# Patient Record
Sex: MALE | Race: WHITE
[De-identification: names, ages, dates, MRNs, and addresses within clinical notes are randomized per-mention and may not be internally consistent; named-entity substitution may affect disease eponyms.]

---

## 2018-01-19 ENCOUNTER — HOSPITAL ENCOUNTER (EMERGENCY)
Dept: HOSPITAL 75 - ER | Age: 28
Discharge: HOME | End: 2018-01-19
Payer: COMMERCIAL

## 2018-01-19 VITALS — SYSTOLIC BLOOD PRESSURE: 129 MMHG | DIASTOLIC BLOOD PRESSURE: 80 MMHG

## 2018-01-19 VITALS — HEIGHT: 69 IN | WEIGHT: 145 LBS | BODY MASS INDEX: 21.48 KG/M2

## 2018-01-19 DIAGNOSIS — J02.0: Primary | ICD-10-CM

## 2018-01-19 DIAGNOSIS — F17.210: ICD-10-CM

## 2018-01-19 LAB
ALBUMIN SERPL-MCNC: 4.2 GM/DL (ref 3.2–4.5)
ALP SERPL-CCNC: 73 U/L (ref 40–136)
ALT SERPL-CCNC: 35 U/L (ref 0–55)
BASOPHILS # BLD AUTO: 0 10^3/UL (ref 0–0.1)
BASOPHILS NFR BLD AUTO: 0 % (ref 0–10)
BASOPHILS NFR BLD MANUAL: 0 %
BILIRUB SERPL-MCNC: 1.3 MG/DL (ref 0.1–1)
BUN/CREAT SERPL: 10
CALCIUM SERPL-MCNC: 9.4 MG/DL (ref 8.5–10.1)
CHLORIDE SERPL-SCNC: 104 MMOL/L (ref 98–107)
CO2 SERPL-SCNC: 21 MMOL/L (ref 21–32)
CREAT SERPL-MCNC: 0.98 MG/DL (ref 0.6–1.3)
EOSINOPHIL # BLD AUTO: 0.1 10^3/UL (ref 0–0.3)
EOSINOPHIL NFR BLD AUTO: 0 % (ref 0–10)
EOSINOPHIL NFR BLD MANUAL: 0 %
ERYTHROCYTE [DISTWIDTH] IN BLOOD BY AUTOMATED COUNT: 12.2 % (ref 10–14.5)
GFR SERPLBLD BASED ON 1.73 SQ M-ARVRAT: > 60 ML/MIN
GLUCOSE SERPL-MCNC: 145 MG/DL (ref 70–105)
HCT VFR BLD CALC: 42 % (ref 40–54)
HGB BLD-MCNC: 15 G/DL (ref 13.3–17.7)
LYMPHOCYTES # BLD AUTO: 1.8 X 10^3 (ref 1–4)
LYMPHOCYTES NFR BLD AUTO: 7 % (ref 12–44)
MANUAL DIFFERENTIAL PERFORMED BLD QL: YES
MCH RBC QN AUTO: 30 PG (ref 25–34)
MCHC RBC AUTO-ENTMCNC: 36 G/DL (ref 32–36)
MCV RBC AUTO: 83 FL (ref 80–99)
MONOCYTES # BLD AUTO: 1.8 X 10^3 (ref 0–1)
MONOCYTES NFR BLD AUTO: 8 % (ref 0–12)
MONOCYTES NFR BLD: 4 %
NEUTROPHILS # BLD AUTO: 20.3 X 10^3 (ref 1.8–7.8)
NEUTROPHILS NFR BLD AUTO: 85 % (ref 42–75)
NEUTS BAND NFR BLD MANUAL: 82 %
NEUTS BAND NFR BLD: 8 %
PLATELET # BLD: 208 10^3/UL (ref 130–400)
PMV BLD AUTO: 10.4 FL (ref 7.4–10.4)
POTASSIUM SERPL-SCNC: 3.7 MMOL/L (ref 3.6–5)
PROT SERPL-MCNC: 7.7 GM/DL (ref 6.4–8.2)
RBC # BLD AUTO: 5.06 10^6/UL (ref 4.35–5.85)
RBC MORPH BLD: NORMAL
SODIUM SERPL-SCNC: 138 MMOL/L (ref 135–145)
VARIANT LYMPHS NFR BLD MANUAL: 6 %
WBC # BLD AUTO: 24 10^3/UL (ref 4.3–11)

## 2018-01-19 PROCEDURE — 85027 COMPLETE CBC AUTOMATED: CPT

## 2018-01-19 PROCEDURE — 96361 HYDRATE IV INFUSION ADD-ON: CPT

## 2018-01-19 PROCEDURE — 96374 THER/PROPH/DIAG INJ IV PUSH: CPT

## 2018-01-19 PROCEDURE — 96372 THER/PROPH/DIAG INJ SC/IM: CPT

## 2018-01-19 PROCEDURE — 80053 COMPREHEN METABOLIC PANEL: CPT

## 2018-01-19 PROCEDURE — 85007 BL SMEAR W/DIFF WBC COUNT: CPT

## 2018-01-19 PROCEDURE — 36415 COLL VENOUS BLD VENIPUNCTURE: CPT

## 2018-01-19 PROCEDURE — 87430 STREP A AG IA: CPT

## 2018-01-19 PROCEDURE — 96375 TX/PRO/DX INJ NEW DRUG ADDON: CPT

## 2018-01-19 PROCEDURE — 87804 INFLUENZA ASSAY W/OPTIC: CPT

## 2018-01-19 NOTE — ED COUGH/URI
General


Chief Complaint:  Cough/Cold/Flu Symptoms


Stated Complaint:  CHILLS/VOMITING/HEADACHE/SORE THROAT


Nursing Triage Note:  


PT TO ED 2 W/ S.O. FOR C/O CONGESTION, SORE THROAT, COUGH, CHILLS ET BODY ACHES 


X3 DAYS.  STATES HE'S TAKEN NYQUIL FOR SYMPTOMS BUT DENIES IMPROVEMENT


Source:  patient, family


Exam Limitations:  no limitations





History of Present Illness


Date Seen by Provider:  Jan 19, 2018


Time Seen by Provider:  20:12


Initial Comments


Here with report of fevers, chills, body aches, cough and occasional vomiting.  

States that he's coughing and gagging and vomiting.  States he doesn't believe 

that this is the flu because he has never felt like this when he is had the flu 

before and then goes on to describe all of the flu symptoms.  Denies dysuria.  

He has taken some NyQuil but otherwise not taken anything for the pain or other 

symptoms.


Timing/Duration:  getting worse, other


Severity/Quality:  moderate, dry cough


Associated Symptoms:  cough, fever/chills, muscle aches, nasal congestion, 

nasal drainage, sore throat





Allergies and Home Medications


Allergies


Coded Allergies:  


     hydrocodone (Unverified  Allergy, Unknown, HEADACHE AND NAUSEA, 11/11/14)





Home Medications


Hydrocodone Bit/Acetaminophen 1 Tab Tablet, 1-2 TAB PO Q4H, #30


   MAY TAKE 1 OR 2 TABS BY MOUTH EVERY 4 HRS AS NEEDED FOR PAIN. 


   Prescribed by: JOHANNE BECERRA on 11/12/14 1128


Nitrofurantoin Macrocrystals 100 Mg Capsule, 1 EACH PO BID, #14


   TAKE ONE CAPSULE BY MOUTH TWICE A DAY FOR 7 DAYS. USE ALL OF THIS ANTIBIOTIC 

AS PRESCRIBED. 


   Prescribed by: JOHANNE BECERRA on 11/12/14 1128


Ondansetron Hcl 4 Mg Tab, 4 MG SL Q4H, #10


   FOR NAUSEA AND VOMITING 


   Prescribed by: MARISA BEARD on 11/4/14 0228


Tamsulosin Hcl 0.4 Mg Cap.er.24h, 0.4 MG PO DAILY, #10


   Prescribed by: MARISA BEARD on 11/4/14 0228





Constitutional:  see HPI, chills, fever, malaise


EENTM:  see HPI


Respiratory:  see HPI, No wheezing


Cardiovascular:  no symptoms reported


Gastrointestinal:  No diarrhea, nausea, vomiting


Genitourinary:  no symptoms reported


Musculoskeletal:  see HPI


Skin:  no symptoms reported


All Other Systems Reviewed


Negative Unless Noted:  Yes





Past Medical-Social-Family Hx


Patient Social History


Alcohol Use:  Denies Use


Recreational Drug Use:  No


Smoking Status:  Current Everyday Smoker


Type Used:  Electronic/Vapor


Recent Foreign Travel:  No


Contact w/Someone Who Travel:  No


Recent Infectious Disease Expo:  No


Physical Abuse:  No


Sexual Abuse:  No


Mistreated:  No


Fear:  No





Seasonal Allergies


Seasonal Allergies:  No





Surgeries


History of Surgeries:  No





Respiratory


History of Respiratory Disorde:  No





Cardiovascular


History of Cardiac Disorders:  No





Neurological


History of Neurological Disord:  No





Reproductive System


Hx Reproductive Disorders:  No


Sexually Transmitted Disease:  No





Gastrointestinal


History of Gastrointestinal Di:  No





Musculoskeletal


History of Musculoskeletal Dis:  Yes


Musculoskeletal Disorders:  Spasms





Endocrine


History of Endocrine Disorders:  No





Cancer


History of Cancer:  No





Psychosocial


History of Psychiatric Problem:  No


Suicide Risk Score:  0





Integumentary


History of Skin or Integumenta:  No





Blood Transfusions


History of Blood Disorders:  No


Adverse Reaction to a Blood Tr:  No





Reviewed Nursing Assessment


Reviewed/Agree w Nursing PMH:  Yes





Family Medical History


Significant Family History:  No Pertinent Family Hx





Physical Exam


Vital Signs





Vital Sign - Last 12Hours








 1/19/18





 19:31


 


Temp 98.4


 


Pulse 91


 


Resp 20


 


B/P (MAP) 137/82 (100)


 


Pulse Ox 96


 


O2 Delivery Room Air





Capillary Refill : Less Than 3 Seconds


General Appearance:  WD/WN, no apparent distress


HEENT:  PERRL/EOMI, pharynx normal


Neck:  full range of motion, supple


Respiratory:  lungs clear, normal breath sounds


Cardiovascular:  regular rate, rhythm, no murmur


Gastrointestinal:  non tender, soft


Extremities:  non-tender, normal inspection


Neurologic/Psychiatric:  alert, oriented x 3


Skin:  normal color, warm/dry





Progress/Results/Core Measures


Suspected Sepsis


Recent Fever Within 48 Hours:  Yes


Infection Criteria Present:  None


New/Unexplained  Altered Menta:  No


Sepsis Screen:  No Definite Risk


Sepsis Diagnosis:  


SIRS


Temperature:98.4 


Pulse: 91 


Respiratory Rate: 20


 


Laboratory Tests


1/19/18 20:25: White Blood Count 24.0H


Blood Pressure 137 /82 


Mean: 100


 


Laboratory Tests


1/19/18 20:25: 


Creatinine 0.98, Platelet Count 208, Total Bilirubin 1.3H








Results/Orders


Lab Results





Laboratory Tests








Test


  1/19/18


20:17 1/19/18


20:25 Range/Units


 


 


Group A Streptococcus Screen POSITIVE H  NEGATIVE  


 


White Blood Count


  


  24.0 H


  4.3-11.0


10^3/uL


 


Red Blood Count


  


  5.06 


  4.35-5.85


10^6/uL


 


Hemoglobin  15.0  13.3-17.7  G/DL


 


Hematocrit  42  40-54  %


 


Mean Corpuscular Volume  83  80-99  FL


 


Mean Corpuscular Hemoglobin  30  25-34  PG


 


Mean Corpuscular Hemoglobin


Concent 


  36 


  32-36  G/DL


 


 


Red Cell Distribution Width  12.2  10.0-14.5  %


 


Platelet Count


  


  208 


  130-400


10^3/uL


 


Mean Platelet Volume  10.4  7.4-10.4  FL


 


Neutrophils (%) (Auto)  85 H 42-75  %


 


Lymphocytes (%) (Auto)  7 L 12-44  %


 


Monocytes (%) (Auto)  8  0-12  %


 


Eosinophils (%) (Auto)  0  0-10  %


 


Basophils (%) (Auto)  0  0-10  %


 


Neutrophils # (Auto)  20.3 H 1.8-7.8  X 10^3


 


Lymphocytes # (Auto)  1.8  1.0-4.0  X 10^3


 


Monocytes # (Auto)  1.8 H 0.0-1.0  X 10^3


 


Eosinophils # (Auto)


  


  0.1 


  0.0-0.3


10^3/uL


 


Basophils # (Auto)


  


  0.0 


  0.0-0.1


10^3/uL


 


Neutrophils % (Manual)  82   %


 


Lymphocytes % (Manual)  6   %


 


Monocytes % (Manual)  4   %


 


Eosinophils % (Manual)  0   %


 


Basophils % (Manual)  0   %


 


Band Neutrophils  8   %


 


Blood Morphology Comment  NORMAL   


 


Sodium Level  138  135-145  MMOL/L


 


Potassium Level  3.7  3.6-5.0  MMOL/L


 


Chloride Level  104    MMOL/L


 


Carbon Dioxide Level  21  21-32  MMOL/L


 


Anion Gap  13  5-14  MMOL/L


 


Blood Urea Nitrogen  10  7-18  MG/DL


 


Creatinine


  


  0.98 


  0.60-1.30


MG/DL


 


Estimat Glomerular Filtration


Rate 


  > 60 


   


 


 


BUN/Creatinine Ratio  10   


 


Glucose Level  145 H   MG/DL


 


Calcium Level  9.4  8.5-10.1  MG/DL


 


Total Bilirubin  1.3 H 0.1-1.0  MG/DL


 


Aspartate Amino Transf


(AST/SGOT) 


  33 


  5-34  U/L


 


 


Alanine Aminotransferase


(ALT/SGPT) 


  35 


  0-55  U/L


 


 


Alkaline Phosphatase  73    U/L


 


Total Protein  7.7  6.4-8.2  GM/DL


 


Albumin  4.2  3.2-4.5  GM/DL








Micro Results





Microbiology


1/19/18 Influenza Types A,B Antigen ([facility]) - Final, Complete


          





My Orders





Orders - ARACELY BLACK MD


Cbc With Automated Diff (1/19/18 20:16)


Comprehensive Metabolic Panel (1/19/18 20:16)


Rapid Strep A Screen (1/19/18 20:16)


Influenza A And B Antigens (1/19/18 20:16)


Ondansetron Injection (Zofran Injectio (1/19/18 20:30)


Ns Iv 1000 Ml (Sodium Chloride 0.9%) (1/19/18 20:16)


Saline Lock/Iv-Start (1/19/18 20:16)


Ketorolac Injection (Toradol Injection) (1/19/18 20:16)


Manual Differential (1/19/18 20:25)


Ns Iv 1000 Ml (Sodium Chloride 0.9%) (1/19/18 21:21)


Penicillin G Proc/Nixon 1.2 Mu (Bicillin (1/19/18 21:30)





Medications Given in ED





Current Medications








 Medications  Dose


 Ordered  Sig/Rosalind


 Route  Start Time


 Stop Time Status Last Admin


Dose Admin


 


 Ondansetron HCl  4 mg  ONCE  ONCE


 IVP  1/19/18 20:30


 1/19/18 20:31 DC 1/19/18 20:30


4 MG


 


 Penicillin G


 Procaine/


 Benzathine  1,200,000


 unit  ONCE  ONCE


 IM  1/19/18 21:30


 1/19/18 21:31 DC 1/19/18 21:44


1,200,000 UNIT


 


 Sodium Chloride  1,000 ml @ 


 0 mls/hr  Q0M ONCE


 IV  1/19/18 21:21


 1/19/18 21:22 DC 1/19/18 21:25


1,000 MLS/HR








Vital Signs/I&O





Vital Sign - Last 12Hours








 1/19/18





 19:31


 


Temp 98.4


 


Pulse 91


 


Resp 20


 


B/P (MAP) 137/82 (100)


 


Pulse Ox 96


 


O2 Delivery Room Air





Capillary Refill : Less Than 3 Seconds








Blood Pressure Mean:  100








Progress Note :  


Progress Note


Seen and evaluated.  IV, labs, normal saline 1 L bolus, Toradol 30 mg IV, and 

influenza screen ordered.  Zofran 4 mg IV.  Monitor patient.  Patient is 

Positive and influenza negative.  We will give penicillin G 1.2 million units 

IM and repeat normal saline 1 L bolus.  Patient is feeling better now.  2220: 

Patient remains feeling better and not persistent infection penicillin.  

Discharged home with return precautions.  Patient verbalize understanding 

instructions and agreement with plan.





Departure


Impression


Impression:  


 Primary Impression:  


 Strep pharyngitis


 Additional Impression:  


 Influenza-like symptoms


Disposition:  01 HOME, SELF-CARE


Condition:  Improved





Departure-Patient Inst.


Decision time for Depature:  22:27


Referrals:  


NO,LOCAL PHYSICIAN (PCP/Family)


Primary Care Physician


Patient Instructions:  Flu, Adult (DC), Strep Throat (DC)





Add. Discharge Instructions:  


All discharge instructions reviewed with patient and/or family. Voiced 

understanding.





Drink plenty of fluids.  You may add ibuprofen 800 mg every 8 hours as needed 

for fever or pain to your current medication regimen.  You may continue the 

DayQuil or NyQuil per package directions.  If you're not using those medicines, 

you may add Tylenol (acetaminophen) 1000 mg every 8 hours as needed for fever 

or pain but do not take this with the cough medicine progress as both have 

acetaminophen in them.  You should get plenty of rest.  Return for worse pain, 

fever, vomiting, weakness, breathing problems or other concerns as needed.











ARACELY BLACK MD Jan 19, 2018 20:57